# Patient Record
Sex: FEMALE | Race: BLACK OR AFRICAN AMERICAN | URBAN - METROPOLITAN AREA
[De-identification: names, ages, dates, MRNs, and addresses within clinical notes are randomized per-mention and may not be internally consistent; named-entity substitution may affect disease eponyms.]

---

## 2019-10-15 PROBLEM — I10 ESSENTIAL HYPERTENSION: Status: ACTIVE | Noted: 2019-10-15

## 2019-10-15 PROBLEM — Z17.1 MALIGNANT NEOPLASM OF UPPER-OUTER QUADRANT OF LEFT BREAST IN FEMALE, ESTROGEN RECEPTOR NEGATIVE (HCC): Status: ACTIVE | Noted: 2019-10-15

## 2019-10-15 PROBLEM — C50.412 MALIGNANT NEOPLASM OF UPPER-OUTER QUADRANT OF LEFT BREAST IN FEMALE, ESTROGEN RECEPTOR NEGATIVE (HCC): Status: ACTIVE | Noted: 2019-10-15

## 2019-10-21 PROBLEM — Z17.1 MALIGNANT NEOPLASM OF LOWER-OUTER QUADRANT OF LEFT BREAST OF FEMALE, ESTROGEN RECEPTOR NEGATIVE (HCC): Status: ACTIVE | Noted: 2019-10-21

## 2019-10-21 PROBLEM — C50.512 MALIGNANT NEOPLASM OF LOWER-OUTER QUADRANT OF LEFT BREAST OF FEMALE, ESTROGEN RECEPTOR NEGATIVE (HCC): Status: ACTIVE | Noted: 2019-10-21

## 2019-11-13 PROBLEM — Z90.12: Status: ACTIVE | Noted: 2019-11-13

## 2019-11-13 PROBLEM — Z98.890 S/P LYMPH NODE BIOPSY: Status: ACTIVE | Noted: 2019-11-13

## 2019-12-02 PROBLEM — D72.829 LEUKOCYTOSIS: Status: ACTIVE | Noted: 2019-12-02

## 2019-12-02 PROBLEM — Z17.1: Status: ACTIVE | Noted: 2019-12-02

## 2019-12-02 PROBLEM — C50.912: Status: ACTIVE | Noted: 2019-12-02

## 2019-12-30 PROBLEM — C50.912 MALIGNANT NEOPLASM OF LEFT FEMALE BREAST (HCC): Status: ACTIVE | Noted: 2019-11-06

## 2020-03-05 PROBLEM — D64.81 ANTINEOPLASTIC CHEMOTHERAPY INDUCED ANEMIA: Status: ACTIVE | Noted: 2020-03-05

## 2020-03-05 PROBLEM — T45.1X5A ANTINEOPLASTIC CHEMOTHERAPY INDUCED ANEMIA: Status: ACTIVE | Noted: 2020-03-05

## 2021-03-12 PROBLEM — Z85.3 HISTORY OF BREAST CANCER: Status: ACTIVE | Noted: 2021-03-12

## 2024-06-12 ENCOUNTER — NURSE ONLY (OUTPATIENT)
Dept: HEMATOLOGY/ONCOLOGY | Facility: HOSPITAL | Age: 59
End: 2024-06-12
Attending: GENETIC COUNSELOR, MS
Payer: COMMERCIAL

## 2024-06-12 ENCOUNTER — GENETICS ENCOUNTER (OUTPATIENT)
Dept: GENETICS | Facility: HOSPITAL | Age: 59
End: 2024-06-12
Attending: GENETIC COUNSELOR, MS
Payer: COMMERCIAL

## 2024-06-12 DIAGNOSIS — C50.912: Primary | ICD-10-CM

## 2024-06-12 DIAGNOSIS — Z80.3 FAMILY HISTORY OF BREAST CANCER: ICD-10-CM

## 2024-06-12 DIAGNOSIS — Z17.1: Primary | ICD-10-CM

## 2024-06-12 DIAGNOSIS — Z80.9 FAMILY HISTORY OF CANCER: ICD-10-CM

## 2024-06-12 PROCEDURE — 96040 HC GENETIC COUNSELING EA 30 MIN: CPT | Performed by: GENETIC COUNSELOR, MS

## 2024-06-12 PROCEDURE — 36415 COLL VENOUS BLD VENIPUNCTURE: CPT

## 2024-06-12 NOTE — PROGRESS NOTES
Patient Name: Arleen Noe  YOB: 1965  Date of Visit: 2024    Reason for visit: Ms. Noe was seen for the purposes of genetic counseling due to her diagnosis of triple negative breast cancer at age 54y and a family history of cancer. The purpose of this visit was to review information regarding genetic testing options for mutations in high penetrance cancer susceptibility genes. She was accompanied to the visit by her daughter.    Referring Provider: Mary Mejia MD    Medical History: Ms. 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 She is s/p left mastectomy with 0/2 LN on 2019. Adjuvant chemo was completed 2020. She transferred to her new oncologist, Dr. Mejia, at [facility] in 2024 who advised genetic testing given her personal h/o triple negative breast cancer.    She denies any other cancer hx.     She had a partial hysterectomy in ~ dt fibroids and menorrhagia, she retains her ovaries.    Ms. Noe's last right mammogram was in 10/14/2023 and was benign. She has had a colonoscopy.     Ms. Noe achieved menarche at approximately 14 years of age, is post-menopausal, and has been pregnant 8 times, delivering her first of 7 children at 18y. Ms. Noe has a 0-year history of oral contraceptive use and denies any fertility or hormone replacement use.      Relevant Family History: Ms. Noe has 5 sons and 2 daughters.    She has 6 sisters and 7 brothers. One of her sisters  in infancy dt SIDS. Another sister  ~51y dt lung cancer dx ~51y with a h/o smoking, she also reportedly had a tongue mass/cancer at 8 y. One of her sister's daughters has a son dx with leukemia at 10-11y.    Ms. Noe's mother  at 59y dt a CVA with no cancer hx. There are 4 maternal aunts and 5 maternal uncles, all with no cancer hx. One of  the maternal aunt's granddaughters was dx with breast cancer in her 40s (this individual's mother is Ms. Noe's maternal 1st cousin and the father is Ms. Noe's paternal 1st cousin). Ms. Noe's maternal grandmother  in her 70s dt breast cancer dx in her 70s. Limited info is known about the maternal grandfather.    Ms. Noe's father  at 67y dt lung cancer dx at 67y, he was a smoker. There was 1 paternal uncle who  in his 50s dt cardiac issues with no cancer hx, he has 3 children. The paternal grandmother  \"young\", likely 40s, dt an unknown type of cancer dx in her 40s. The paternal grandfather  in his 40-50s with limited medical hx info known.     The rest of the family history is negative for other significant genetic conditions, cancers, or birth defects of any kind. See scanned pedigree for full family history reported during the session.    Ms. Noe's maternal and paternal ethnicity is Black. She is not of any Ashkenazi Denominational heritage.     Summary: The majority of cancers are sporadic whereas approximately 10-30% of cases of cancer cases are attributed to familial factors such as unidentified low penetrance genes in the family or shared environmental factors.  Approximately 5-10% of cancers are related to a hereditary cancer syndrome.  Signs of a hereditary cancer syndrome include some rare cancers, common cancers occurring at unusually young ages, multiple primary cancers in the same individual, multiple colorectal polyps, or the same type of cancer or related cancers (e.g., breast and ovarian, colorectal and endometrial) in three or more individuals in the same lineage.     Cancer is usually caused by gene mutations that occur randomly in one or a few cells of the body. Such gene changes, called somatic mutations, may arise as a natural consequence of aging or when a cell’s DNA has been damaged. Acquired mutations are only present in some of the body’s cells, and  they are not passed on from parents to their children.    However, in the small percentage of people with a hereditary cancer syndrome, the disease is due to a different type of mutation called a hereditary mutation, or germline mutation. These mutations are usually inherited from one or both of the person’s parents, and are present in nearly every cell of the body. Because hereditary mutations are present in the DNA of sperm and egg cells, they can be passed down in families. People who carry such hereditary mutations do not necessarily get cancer, but their risk of developing the disease at some point during their lifetime is higher than average. When a personal and/or family history doesn't clearly fit a single hereditary cancer syndrome, panel genetic testing examining multiple genes in which pathogenic mutations cause hereditary cancer syndromes is appropriate.     If testing is performed, three results are possible: positive, negative, and variant of uncertain significance.  A positive result indicates a pathogenic variant (harmful gene mutation) has been identified, and there is an increased risk for the cancers associated with the specific gene.  Since pathogenic variants in most cancer susceptibility genes are inherited in an autosomal dominant fashion, siblings and children of individuals with a pathogenic variant have a 50% risk of carrying the same familial pathogenic variant as well.      A negative test result would indicate that no pathogenic variant was identified in a cancer susceptibility gene.  While testing detects gene mutations, it is possible for a genetic variant to be present and go undetected.  It is also possible for a genetic variant to be located in a gene other than those being tested.     A variant of uncertain significance means that a change has been identified a cancer susceptibility gene; however, it is uncertain if the variant is pathogenic or a non-deleterious (benign) change.   With time, the variant may be reclassified as either pathogenic or benign.    Since pathogenic variant identification is necessary, an affected family member is preferred in order to confirm that a pathogenic variant is indeed present in a particular family.  If the pathogenic variant can be identified in an affected individual, this information can be used to screen other at-risk individuals in the family.  Individuals who are positive for the same pathogenic variant would be expected to have a much greater risk for developing hereditary cancer during their lifetime than the general population and surveillance and management would be rigorous.  For those individuals who are found to not carry the pathogenic variant, risks for developing cancer during their lifetime would return to those expected for individuals in the general population.  It should be emphasized that absence of a pathogenic variant in an at-risk individual would not eliminate their risk for cancer, but simply return them to the risk expected for the general population. If no affected individual is available for testing, a negative result, while reassuring, cannot be completely informative of the familial cancer risk as it is unknown whether no pathogenic variant was found because the individual tested is truly negative for the familial pathogenic variant or whether the familial pathogenic variant was unable to be detected by the genetic testing method used. In such cases, screening and follow-up should be guided by personal and family history.     Because Ms. Noe was diagnosed with triple negative breast cancer at 54y with a family history of breast cancer in her maternal grandmother, genetic testing for pathogenic variants in high-penetrance cancer susceptibility genes is indicated based on the NCCN guidelines (BOP V.3.2024).      Ms. Noe appeared to understand the information presented. On the day of the visit Ms. Noe elected to  proceed with genetic testing for hereditary cancer syndromes. My office will call MsLaura Kusum as soon as results are received; post-test counseling can be scheduled at that time. Thank you for allowing me to participate in the care of your patient; please do not hesitate to contact my office if you have any questions or concerns, [phone].    Plan:   Blood was drawn and sent out for [facility]'s BRCA1/2 panel in the setting of the common hereditary cancers + RNA panel (TAT: 2 weeks, 48 genes).    The Genetics office will call Ms. Noe when results are available.  Recommendations for Ms. Noe and family members will depend the above genetic testing results.      Send to: Jackie/Steve  Time spent with patient: 40 minutes      Anay Fay MS, [facility]

## 2024-06-24 ENCOUNTER — GENETICS ENCOUNTER (OUTPATIENT)
Dept: HEMATOLOGY/ONCOLOGY | Facility: HOSPITAL | Age: 59
End: 2024-06-24

## 2024-06-24 ENCOUNTER — TELEPHONE (OUTPATIENT)
Dept: HEMATOLOGY/ONCOLOGY | Facility: HOSPITAL | Age: 59
End: 2024-06-24

## 2024-06-24 DIAGNOSIS — Z13.71 BRCA GENE MUTATION NEGATIVE IN FEMALE: Primary | ICD-10-CM

## 2024-06-24 NOTE — TELEPHONE ENCOUNTER
BRENNA asking Arleen to CB to review her genetic testing results. My CB # is: [phone].    James

## 2024-06-24 NOTE — PROGRESS NOTES
Patient Name: Arleen Noe  YOB: 1965    Referring Provider:  Mary Mejia MD      Reason for Referral:  Ms. Noe had genetic testing performed on 6/12/2024 because of a diagnosis of triple negative breast cancer at age 54y and a family history of cancer .      Genetic Testing Result:  Negative for pathogenic variants. One variant of uncertain significance identified. No pathogenic variant was found in the following 48 genes on the [facility] BRCA1/2 panel and common hereditary cancers + RNA panel: APC, NESS, AXIN2, BAP1, BARD1, BMPR1A, BRCA1, BRCA2, BRIP1, CDH1, CDK4, CDKN2A, CHEK2, CTNNA1, DICER1, EPCAM, FH, GREM1, HOXB13, KIT, MBD4, MEN1, MLH1, MSH2, MSH3, MSH6, MUTYH, NF1, NTHL1, PALB2, PDGFRA, PMS2, POLD1, POLE, PTEN, RAD51C, RAD51D, SDHA, SDHB, SDHC, SDHD, SMAD4, SMARCA4, STK11, TP53, TSC1, TSC2, VHL. A variant of uncertain significance, MSH3 c.2180G>A ([id number]), was identified.  Please refer to the report from [facility] for additional testing information. These results were discussed with Ms. Noe via telephone on 6/26/2024.    Summary and Plan:  These results indicate that Ms. Noe was not found to have a pathogenic variant (harmful genetic mutation) in any of the genes listed above. No pathogenic variants associated with hereditary breast cancer syndromes or other hereditary cancer syndromes were identified. The etiology Ms. Noe's personal and family history of cancer remains genetically unexplained. The limitations of the testing were discussed with Ms. Noe including the chance that a pathogenic variant in a gene other than those included in this analysis might be the cause of cancer in Ms. Noe or in relatives.     Ms. Noe was found to have a MSH3 c.2180G>A ([id number]) variant of uncertain significance. A variant of uncertain significance (VUS) means that a genetic variant has been identified in a cancer susceptibility gene; however, it is currently  uncertain if the variant is pathogenic (harmful) or benign (harmless).  With time, the variant may be reclassified as either harmful or harmless, most VUS's end up being reclassified as harmless variants. Should a VUS get reclassified in the future, the genetic testing laboratory will issue an amended report with the updated classification. No changes in management or testing of family members is recommended based on a VUS result.     Medical management and surveillance for Ms. Noe and other family members should be based on their personal and family history. All medical management decisions should be made with a physician.     I encouraged Ms. Noe to share her genetic test results with her children and other relatives so that they may discuss the implications of this information with their health providers. Ms. Noe is also encouraged to contact me on an annual basis to learn if there have been any updates in genetic information that would apply, changes in the personal and/or family history, or changes in the classification of the MSH3 VUS. Please do not hesitate to contact my office if you have any questions or concerns, [phone].     Anay Fay MS, CGC

## 2024-06-26 PROBLEM — Z13.71 BRCA GENE MUTATION NEGATIVE IN FEMALE: Status: ACTIVE | Noted: 2024-06-01
